# Patient Record
Sex: FEMALE | Race: OTHER | HISPANIC OR LATINO | URBAN - METROPOLITAN AREA
[De-identification: names, ages, dates, MRNs, and addresses within clinical notes are randomized per-mention and may not be internally consistent; named-entity substitution may affect disease eponyms.]

---

## 2018-12-07 ENCOUNTER — TRANSCRIBE ORDERS (OUTPATIENT)
Dept: ADMINISTRATIVE | Facility: HOSPITAL | Age: 38
End: 2018-12-07

## 2018-12-07 DIAGNOSIS — R22.42 MASS OF LOWER LEG, LEFT: ICD-10-CM

## 2018-12-07 DIAGNOSIS — R63.8 EFFECTS OF THIRST: Primary | ICD-10-CM

## 2018-12-12 ENCOUNTER — HOSPITAL ENCOUNTER (OUTPATIENT)
Dept: ULTRASOUND IMAGING | Facility: HOSPITAL | Age: 38
Discharge: HOME | End: 2018-12-12
Attending: FAMILY MEDICINE
Payer: COMMERCIAL

## 2018-12-12 DIAGNOSIS — R22.42 MASS OF LOWER LEG, LEFT: ICD-10-CM

## 2018-12-12 DIAGNOSIS — M79.652 LEFT THIGH PAIN: ICD-10-CM

## 2018-12-12 PROCEDURE — 76882 US LMTD JT/FCL EVL NVASC XTR: CPT

## 2023-12-20 ENCOUNTER — OFFICE VISIT (OUTPATIENT)
Dept: OBGYN CLINIC | Facility: CLINIC | Age: 43
End: 2023-12-20
Payer: COMMERCIAL

## 2023-12-20 VITALS
BODY MASS INDEX: 31.07 KG/M2 | HEIGHT: 64 IN | SYSTOLIC BLOOD PRESSURE: 114 MMHG | WEIGHT: 182 LBS | DIASTOLIC BLOOD PRESSURE: 70 MMHG

## 2023-12-20 DIAGNOSIS — Z11.3 ROUTINE SCREENING FOR STI (SEXUALLY TRANSMITTED INFECTION): ICD-10-CM

## 2023-12-20 DIAGNOSIS — Z01.419 ENCOUNTER FOR ANNUAL ROUTINE GYNECOLOGICAL EXAMINATION: Primary | ICD-10-CM

## 2023-12-20 DIAGNOSIS — R10.2 PELVIC PRESSURE IN FEMALE: ICD-10-CM

## 2023-12-20 DIAGNOSIS — Z12.31 ENCOUNTER FOR SCREENING MAMMOGRAM FOR MALIGNANT NEOPLASM OF BREAST: ICD-10-CM

## 2023-12-20 DIAGNOSIS — N84.1 ENDOCERVICAL POLYP: ICD-10-CM

## 2023-12-20 DIAGNOSIS — Z12.4 SCREENING FOR CERVICAL CANCER: ICD-10-CM

## 2023-12-20 PROCEDURE — 57500 BIOPSY OF CERVIX: CPT | Performed by: STUDENT IN AN ORGANIZED HEALTH CARE EDUCATION/TRAINING PROGRAM

## 2023-12-20 PROCEDURE — 99213 OFFICE O/P EST LOW 20 MIN: CPT | Performed by: STUDENT IN AN ORGANIZED HEALTH CARE EDUCATION/TRAINING PROGRAM

## 2023-12-20 PROCEDURE — 99386 PREV VISIT NEW AGE 40-64: CPT | Performed by: STUDENT IN AN ORGANIZED HEALTH CARE EDUCATION/TRAINING PROGRAM

## 2023-12-20 NOTE — ASSESSMENT & PLAN NOTE
- Endocervical polyp noted on exam. Likely source of patient-reported vaginal spotting. Polyp removed using ring forceps. Will send to pathology for evaluation.

## 2023-12-20 NOTE — PROGRESS NOTES
[facility] - [address], [address], [place] [address]    ASSESSMENT/PLAN: Donya Coon is a 43 y.o.  who presents for annual gynecologic exam. She is a new patient.    Encounter for routine gynecologic examination  - Routine well woman exam completed today.  - Cervical Cancer Screening: Current ASCCP Guidelines reviewed. Last Pap: 2022. History of abnormal: Remote history, returned to normal. No history of diagnostic excisional procedure. Per patient request, a pap was collected today. We reviewed that if normal, ASCCP guidelines allow for spacing of pap testing up to every 5 years.   - STI screening offered including HIV testing: GC/CT and blood work ordered  - Contraceptive counseling discussed.  Current contraception: vasectomy.   - Breast Cancer Screening: Last Mammogram 2022. Repeat ordered.   - The following were reviewed in today's visit: breast self exam, mammography screening ordered, exercise, and healthy diet    I explained to Donya that due to our extra time and review of a separate problem at her Wellness visit today, her insurance would be billed for a GYN Wellness visit and a Problem visit.  She may be responsible for a copay for this visit do to the problem component.  She understands and is fine with this.  She appreciated the extra time spent today.    Additional problems addressed during this visit:  1. Encounter for annual routine gynecological examination    2. Encounter for screening mammogram for malignant neoplasm of breast  -     Mammo screening bilateral w 3d & cad; Future    3. Pelvic pressure in female  Assessment & Plan:  - Given recent onset with associated gas, suspect GI etiology. However, will obtain pelvic ultrasound for evaluation of adnexal structures. Urinalysis and urine culture collected and sent from office today, given patient concern for possible UTI. Urine dipstick in office was unremarkable. Will await results and treat if  positive.     Orders:  -     Urinalysis with microscopic  -     Urine culture  -     US pelvis complete w transvaginal; Future; Expected date: 2023    4. Screening for cervical cancer  -     Thinprep Tis Pap, HPV mRNA E6/E7 Rfx HPV 16,18/45, Chlamydia/N.gonorrhoeae    5. Routine screening for STI (sexually transmitted infection)  -     Hepatitis C Ab W/Refl To HCV RNA, Qn, PCR; Future  -     HIV 1/2 AG/AB W REFLEX [facility] only; Future  -     RPR (MONITOR) W/REFL TITER (REFL); Future  -     Hepatitis C Ab W/Refl To HCV RNA, Qn, PCR  -     HIV 1/2 AG/AB W REFLEX [facility] only  -     RPR (MONITOR) W/REFL TITER (REFL)    6. Endocervical polyp  Assessment & Plan:  - Endocervical polyp noted on exam. Likely source of patient-reported vaginal spotting. Polyp removed using ring forceps. Will send to pathology for evaluation.     Orders:  -     Tissue Pathology        CC:  Annual Gynecologic Examination - New patient    HPI: Donya Coon is a 43 y.o.  who presents for annual gynecologic examination. Today, patient reports recent onset of left-sided pelvic pain and pressure. She is concerned for possible ovarian cyst and/or urinary tract infection. She also reports occasional mid-cycle vaginal spotting.     ROS: Negative except as noted in HPI    Patient's last menstrual period was 2023.  Menstrual History  Period Cycle (Days): 28  Period Duration (Days): 3-5  Period Pattern: Regular  Menstrual Flow: Moderate, Heavy  Dysmenorrhea: None    She  reports being sexually active and has had partner(s) who are male. She reports using the following method of birth control/protection: Male Sterilization.  Sexual History  Sexual Assault: No  Sexually Transmitted Infection History: None    The following portions of the patient's history were reviewed and updated as appropriate:   History reviewed. No pertinent past medical history.  Past Surgical History:   Procedure Laterality Date    SINUS SURGERY  "      Family History   Problem Relation Age of Onset    Stroke Mother     Colon cancer Maternal Grandfather     Breast cancer Paternal Grandmother     Ovarian cancer Neg Hx     Uterine cancer Neg Hx      Social History     Socioeconomic History    Marital status: /Civil Union     Spouse name: None    Number of children: None    Years of education: None    Highest education level: None   Occupational History    None   Tobacco Use    Smoking status: Never     Passive exposure: Never    Smokeless tobacco: Never   Vaping Use    Vaping status: Never Used   Substance and Sexual Activity    Alcohol use: Not Currently    Drug use: Never    Sexual activity: Yes     Partners: Male     Birth control/protection: Male Sterilization   Other Topics Concern    None   Social History Narrative    None     Social Determinants of Health     Financial Resource Strain: Not on file   Food Insecurity: Not on file   Transportation Needs: Not on file   Physical Activity: Not on file   Stress: Not on file   Social Connections: Not on file   Intimate Partner Violence: Not on file   Housing Stability: Not on file     No outpatient medications have been marked as taking for the 12/20/23 encounter (Office Visit) with George Martinez MD.     Allergies   Allergen Reactions    Penicillins Hives and Rash           Objective:  /70 (BP Location: Left arm, Patient Position: Sitting, Cuff Size: Large)   Ht 5' 4\" (1.626 m)   Wt 82.6 kg (182 lb)   LMP 11/28/2023   BMI 31.24 kg/m²        Chaperone present? Yes: Tae Mariee MA.    General Appearance: alert and oriented, in no acute distress.   Neck/Thyroid: No thyromegaly, no thyroid nodules.  Respiratory: Unlabored breathing.  Cardiovascular: Regular rate, no peripheral edema.  Abdomen: Soft, non-tender, non-distended, no masses, no rebound or guarding.  Breast Exam: No dimpling, nipple retraction or discharge. No lumps or masses. No axillary or supraclavicular nodes.   Pelvic:       " External genitalia: Normal appearance, no abnormal pigmentation, no lesions or masses. Normal Bartholin's and [place]'s.      Urinary system: Urethral meatus normal, bladder non-tender.      Vaginal: normal mucosa without prolapse or lesions. Normal-appearing physiologic discharge.      Cervix: Approximately 2-3 mm endocervical polyp noted. Polyp grasped with ring forceps and removed with gentle traction. Cervix well-epithelialized, no gross lesions or masses. No cervical motion tenderness.      Adnexa: No adnexal masses or tenderness noted.      Uterus: Normal-sized, regular contour, midline, mobile, no uterine tenderness.  Extremities: Normal range of motion. Warm, well-perfused, non-tender.   Skin: normal, no rash or abnormalities  Neurologic: alert, oriented x3  Psychiatric: Appropriate affect, mood stable, cooperative with exam.        George Martinez MD  12/20/2023 4:53 PM

## 2023-12-20 NOTE — ASSESSMENT & PLAN NOTE
- Given recent onset with associated gas, suspect GI etiology. However, will obtain pelvic ultrasound for evaluation of adnexal structures. Urinalysis and urine culture collected and sent from office today, given patient concern for possible UTI. Urine dipstick in office was unremarkable. Will await results and treat if positive.

## 2023-12-21 LAB
APPEARANCE UR: CLEAR
BACTERIA UR QL AUTO: NORMAL /HPF
BILIRUB UR QL STRIP: NEGATIVE
COLOR UR: YELLOW
GLUCOSE UR QL STRIP: NEGATIVE
HGB UR QL STRIP: NEGATIVE
HYALINE CASTS #/AREA URNS LPF: NORMAL /LPF
KETONES UR QL STRIP: NEGATIVE
LEUKOCYTE ESTERASE UR QL STRIP: NEGATIVE
NITRITE UR QL STRIP: NEGATIVE
PH UR STRIP: 6 [PH] (ref 5–8)
PROT UR QL STRIP: NEGATIVE
RBC #/AREA URNS HPF: NORMAL /HPF
SP GR UR STRIP: 1.01 (ref 1–1.03)
SQUAMOUS #/AREA URNS HPF: NORMAL /HPF
WBC #/AREA URNS HPF: NORMAL /HPF

## 2023-12-22 LAB
C TRACH RRNA SPEC QL NAA+PROBE: NOT DETECTED
CLINICAL INFO: NORMAL
N GONORRHOEA RRNA SPEC QL NAA+PROBE: NOT DETECTED
PATH REPORT.FINAL DX SPEC: NORMAL
PATHOLOGIST NAME: NORMAL
SPECIMEN SOURCE: NORMAL

## 2023-12-26 NOTE — RESULT ENCOUNTER NOTE
Left voicemail for patient informing her of normal urine results and benign endometrial polyp. Reminded her of upcoming pelvic US scheduled for 1/16, which is important due to possibility that there is residual endometrial polyp which may require removal. Instructed patient to call the office if she has any questions.     George Martinez MD  12/26/2023 12:46 PM

## 2023-12-28 LAB
C TRACH RRNA SPEC QL NAA+PROBE: NOT DETECTED
CLINICAL INFO: NORMAL
CYTO CVX: NORMAL
DATE PREVIOUS BX: NORMAL
HPV E6+E7 MRNA CVX QL NAA+PROBE: NOT DETECTED
LMP START DATE: NORMAL
N GONORRHOEA RRNA SPEC QL NAA+PROBE: NOT DETECTED
SL AMB PREV. PAP:: NORMAL
SPECIMEN SOURCE CVX/VAG CYTO: NORMAL

## 2024-01-15 ENCOUNTER — HOSPITAL ENCOUNTER (OUTPATIENT)
Dept: ULTRASOUND IMAGING | Facility: HOSPITAL | Age: 44
Discharge: HOME | End: 2024-01-15
Attending: STUDENT IN AN ORGANIZED HEALTH CARE EDUCATION/TRAINING PROGRAM
Payer: COMMERCIAL

## 2024-01-15 DIAGNOSIS — R10.2 PELVIC PRESSURE IN FEMALE: ICD-10-CM

## 2024-01-15 PROCEDURE — 76830 TRANSVAGINAL US NON-OB: CPT

## 2024-01-15 PROCEDURE — 76856 US EXAM PELVIC COMPLETE: CPT

## 2024-01-16 ENCOUNTER — HOSPITAL ENCOUNTER (OUTPATIENT)
Dept: CT IMAGING | Facility: HOSPITAL | Age: 44
Discharge: HOME | End: 2024-01-16
Attending: FAMILY MEDICINE
Payer: COMMERCIAL

## 2024-01-16 DIAGNOSIS — Z13.6 ENCOUNTER FOR SCREENING FOR CARDIOVASCULAR DISORDERS: ICD-10-CM

## 2024-01-16 PROCEDURE — 75571 CT HRT W/O DYE W/CA TEST: CPT

## 2024-01-16 PROCEDURE — G1004 CDSM NDSC: HCPCS

## 2024-01-23 ENCOUNTER — TELEPHONE (OUTPATIENT)
Dept: OBGYN CLINIC | Facility: CLINIC | Age: 44
End: 2024-01-23

## 2024-01-23 NOTE — TELEPHONE ENCOUNTER
Called patient to discuss. No answer. I left voicemail informing patient that I sent her a detailed message regarding her result via [employer] earlier today. Encouraged patient to call if she would like to discuss further. See result message attached to ultrasound report.     George Martinez MD  1/23/2024 4:58 PM

## 2024-01-23 NOTE — TELEPHONE ENCOUNTER
Pilar l/m she would like her pelvic u/s results and recommendations. Return call, l/m on pilar's v/m  U/s resulted on Sunday. Dr. Martinez covered hospital yesterday- Seeing patients today. Will forward to DR Martinez and call her back with result and recommendation most likely on Wednesday. Dr. Martinez, please review and advise

## 2024-06-08 LAB
HCV AB SERPL QL IA: NORMAL
HIV 1+2 AB+HIV1 P24 AG SERPL QL IA: NORMAL
RPR SER QL: NORMAL

## 2024-10-01 ENCOUNTER — TELEPHONE (OUTPATIENT)
Age: 44
End: 2024-10-01

## 2024-10-01 NOTE — TELEPHONE ENCOUNTER
Patient called and will have Mammo done at [facility]. Patient would like a hard copy of script mailed to her.

## 2024-12-30 ENCOUNTER — ANNUAL EXAM (OUTPATIENT)
Dept: OBGYN CLINIC | Facility: CLINIC | Age: 44
End: 2024-12-30
Payer: COMMERCIAL

## 2024-12-30 VITALS
BODY MASS INDEX: 31.92 KG/M2 | WEIGHT: 191.6 LBS | HEIGHT: 65 IN | SYSTOLIC BLOOD PRESSURE: 126 MMHG | DIASTOLIC BLOOD PRESSURE: 76 MMHG

## 2024-12-30 DIAGNOSIS — N92.0 MENORRHAGIA WITH REGULAR CYCLE: ICD-10-CM

## 2024-12-30 DIAGNOSIS — Z01.419 ENCOUNTER FOR ANNUAL ROUTINE GYNECOLOGICAL EXAMINATION: Primary | ICD-10-CM

## 2024-12-30 DIAGNOSIS — Z80.3 FAMILY HISTORY OF BREAST CANCER IN FEMALE: ICD-10-CM

## 2024-12-30 DIAGNOSIS — Z12.4 SCREENING FOR CERVICAL CANCER: ICD-10-CM

## 2024-12-30 DIAGNOSIS — Z12.31 BREAST CANCER SCREENING BY MAMMOGRAM: ICD-10-CM

## 2024-12-30 PROCEDURE — 99396 PREV VISIT EST AGE 40-64: CPT | Performed by: STUDENT IN AN ORGANIZED HEALTH CARE EDUCATION/TRAINING PROGRAM

## 2024-12-30 RX ORDER — ROSUVASTATIN CALCIUM 5 MG/1
1 TABLET, COATED ORAL DAILY
COMMUNITY
Start: 2024-12-02

## 2024-12-30 RX ORDER — NORGESTIMATE AND ETHINYL ESTRADIOL 7DAYSX3 LO
1 KIT ORAL DAILY
Qty: 84 TABLET | Refills: 1 | Status: SHIPPED | OUTPATIENT
Start: 2024-12-30

## 2024-12-30 NOTE — PROGRESS NOTES
Name: Donya Coon      : 1980      MRN: [id number]  Encounter Provider: George Martinez MD  Encounter Date: 2024   Encounter department: [facility] [place]  :  Assessment & Plan  Encounter for annual routine gynecological examination  - Routine well woman gynecologic completed today.  - Cervical Cancer Screening: Current ASCCP Guidelines reviewed. Last Pap: 2023. History of abnormal: Remote history. A pap was collected today, per patient request.   - STI screening offered including HIV testing: GC/CT and blood work ordered  - Contraceptive counseling discussed. Current contraception: partner had vasectomy  - Breast Cancer Screening: Last Mammogram 2022. Repeat is schedueld.   - Colorectal cancer screening was not ordered, as she is scheduled for colonoscopy next week.   - The following were reviewed in today's visit: breast self exam, mammography screening ordered, exercise, and healthy diet       Menorrhagia with regular cycle  - Suspect secondary to adenomyosis based on findings of pelvic ultrasound last year. Discussed management options including OCP, LNG-IUD, DMPA, or hysterectomy. She desires trial of OCP. No contraindication. Rx provided. Return to office for pill check in 3-4 months.   Orders:  •  Norgestimate-Eth Estradiol (Ortho Tri-Cyclen Lo) 0.18/0.215/0.25 MG-25 MCG TABS; Take 1 tablet by mouth in the morning    Family history of breast cancer in female    Orders:  •  Mammo screening bilateral w 3d and cad; Future    Breast cancer screening by mammogram    Orders:  •  Mammo screening bilateral w 3d and cad; Future    Screening for cervical cancer    Orders:  •  Thinprep Tis Pap, HPV mRNA E6/E7 Rfx HPV 16,18/45, Chlamydia/N.gonorrhoeae        History of Present Illness   HPI  Donya Coon is a 44 y.o. female who presents for routine gynecologic preventative care visit. Today, she reports heavy menses and desires to discuss management options.   History  "obtained from: patient    Period Cycle (Days): 28  Period Duration (Days): 4-5  Period Pattern: Regular  Menstrual Flow: Heavy  Menstrual Control: Maxi pad      Review of Systems   All other systems reviewed and are negative.  Medical History Reviewed by provider this encounter:     .     Objective   /76 (BP Location: Left arm, Patient Position: Sitting, Cuff Size: Large)   Ht 5' 4.75\" (1.645 m)   Wt 86.9 kg (191 lb 9.6 oz)   LMP 12/05/2024 (Exact Date)   BMI 32.13 kg/m²      Physical Exam  Vitals reviewed. Exam conducted with a chaperone present (Terra Campos MA).   Constitutional:       General: She is not in acute distress.     Appearance: Normal appearance. She is well-developed.   HENT:      Head: Normocephalic.   Eyes:      Conjunctiva/sclera: Conjunctivae normal.   Cardiovascular:      Rate and Rhythm: Normal rate.   Pulmonary:      Effort: Pulmonary effort is normal. No respiratory distress.   Chest:      Chest wall: No mass or deformity.   Breasts:     Right: Normal. No mass, nipple discharge, skin change or tenderness.      Left: Normal. No mass, nipple discharge, skin change or tenderness.   Abdominal:      General: Abdomen is flat.      Palpations: Abdomen is soft.      Tenderness: There is no abdominal tenderness.   Genitourinary:     General: Normal vulva.      Exam position: Lithotomy position.      Labia:         Right: No tenderness, lesion or injury.         Left: No tenderness, lesion or injury.       Urethra: No urethral pain, urethral swelling or urethral lesion.      Vagina: Normal. No vaginal discharge, bleeding, lesions or prolapsed vaginal walls.      Cervix: Normal. No cervical motion tenderness, discharge, friability, lesion, erythema or cervical bleeding.      Uterus: Normal. Not deviated and not tender.       Adnexa: Right adnexa normal and left adnexa normal.        Right: No mass, tenderness or fullness.          Left: No mass, tenderness or fullness.     Musculoskeletal:    "      General: No swelling.   Lymphadenopathy:      Upper Body:      Right upper body: No supraclavicular, axillary or pectoral adenopathy.      Left upper body: No supraclavicular, axillary or pectoral adenopathy.   Skin:     General: Skin is warm and dry.   Neurological:      Mental Status: She is alert.   Psychiatric:         Mood and Affect: Mood normal.         Behavior: Behavior normal.         Thought Content: Thought content normal.         Judgment: Judgment normal.

## 2025-01-02 ENCOUNTER — RESULTS FOLLOW-UP (OUTPATIENT)
Dept: LABOR AND DELIVERY | Facility: HOSPITAL | Age: 45
End: 2025-01-02

## 2025-01-02 LAB
C TRACH RRNA SPEC QL NAA+PROBE: NOT DETECTED
CLINICAL INFO: NORMAL
CYTO CVX: NORMAL
CYTOLOGY CMNT CVX/VAG CYTO-IMP: NORMAL
DATE PREVIOUS BX: NORMAL
HPV E6+E7 MRNA CVX QL NAA+PROBE: NOT DETECTED
LMP START DATE: NORMAL
N GONORRHOEA RRNA SPEC QL NAA+PROBE: NOT DETECTED
SL AMB PREV. PAP:: NORMAL
SPECIMEN SOURCE CVX/VAG CYTO: NORMAL

## 2025-01-07 ENCOUNTER — HOSPITAL ENCOUNTER (OUTPATIENT)
Dept: MAMMOGRAPHY | Facility: IMAGING CENTER | Age: 45
Discharge: HOME | End: 2025-01-07
Payer: COMMERCIAL

## 2025-01-07 VITALS — WEIGHT: 189 LBS | HEIGHT: 65 IN | BODY MASS INDEX: 31.49 KG/M2

## 2025-01-07 DIAGNOSIS — Z80.3 FAMILY HISTORY OF BREAST CANCER IN FEMALE: ICD-10-CM

## 2025-01-07 DIAGNOSIS — Z12.31 BREAST CANCER SCREENING BY MAMMOGRAM: ICD-10-CM

## 2025-01-07 PROCEDURE — 77063 BREAST TOMOSYNTHESIS BI: CPT

## 2025-01-07 PROCEDURE — 77067 SCR MAMMO BI INCL CAD: CPT

## 2025-04-23 ENCOUNTER — OFFICE VISIT (OUTPATIENT)
Dept: URGENT CARE | Facility: CLINIC | Age: 45
End: 2025-04-23
Payer: COMMERCIAL

## 2025-04-23 ENCOUNTER — APPOINTMENT (OUTPATIENT)
Dept: RADIOLOGY | Facility: CLINIC | Age: 45
End: 2025-04-23
Attending: FAMILY MEDICINE
Payer: COMMERCIAL

## 2025-04-23 VITALS
DIASTOLIC BLOOD PRESSURE: 78 MMHG | TEMPERATURE: 97.9 F | OXYGEN SATURATION: 98 % | RESPIRATION RATE: 18 BRPM | HEART RATE: 72 BPM | SYSTOLIC BLOOD PRESSURE: 126 MMHG

## 2025-04-23 DIAGNOSIS — S99.922A INJURY OF LEFT FOOT, INITIAL ENCOUNTER: ICD-10-CM

## 2025-04-23 DIAGNOSIS — S92.512A CLOSED DISPLACED FRACTURE OF PROXIMAL PHALANX OF LESSER TOE OF LEFT FOOT, INITIAL ENCOUNTER: Primary | ICD-10-CM

## 2025-04-23 PROCEDURE — 73630 X-RAY EXAM OF FOOT: CPT

## 2025-04-23 PROCEDURE — G0382 LEV 3 HOSP TYPE B ED VISIT: HCPCS | Performed by: FAMILY MEDICINE

## 2025-04-23 NOTE — PROGRESS NOTES
[facility]        NAME: Donya Coon is a 44 y.o. female  : 1980    MRN: [id number]  DATE: 2025  TIME: 6:22 PM    Assessment and Plan   Closed displaced fracture of proximal phalanx of lesser toe of left foot, initial encounter [S92.512A]  1. Closed displaced fracture of proximal phalanx of lesser toe of left foot, initial encounter  Ambulatory referral to Orthopedic Surgery      2. Injury of left foot, initial encounter  XR foot 3+ vw left            Patient Instructions   Postop walking shoe as instructed.  Recommend ice to the affected area for pain and swelling.  May use ibuprofen as needed for pain relief.  Follow-up with orthopedics.    Follow up with PCP in 3-5 days.  Proceed to  ER if symptoms worsen.    If tests have been performed at [facility], our office will contact you with results if changes need to be made to the care plan discussed with you at the visit.  [facility]    Chief Complaint     Chief Complaint   Patient presents with    Foot Pain     Patient states she hit her left pinky toe on the side of her wall tonight, she is concerned she may have sprained her toe or foot.          History of Present Illness       44-year-old female presenting with left toe pain.  She reports stubbing her little toe on the corner of the wall as she was running around the corner in her house.  She is now experiencing pain and swelling of her little toe and does note some deformity.        Review of Systems   Review of Systems   Constitutional: Negative.    HENT: Negative.     Eyes: Negative.    Respiratory: Negative.     Cardiovascular: Negative.    Gastrointestinal: Negative.    Genitourinary: Negative.    Musculoskeletal:  Positive for arthralgias, joint swelling and myalgias.   Skin: Negative.    Allergic/Immunologic: Negative.    Neurological: Negative.    Hematological: Negative.    Psychiatric/Behavioral: Negative.           Current  Medications       Current Outpatient Medications:     Biotin 5 MG TBDP, Take by mouth, Disp: , Rfl:     Emollient (COLLAGEN EX), Apply topically, Disp: , Rfl:     Norgestimate-Eth Estradiol (Ortho Tri-Cyclen Lo) 0.18/0.215/0.25 MG-25 MCG TABS, Take 1 tablet by mouth in the morning, Disp: 84 tablet, Rfl: 1    rosuvastatin (CRESTOR) 5 mg tablet, Take 1 tablet by mouth in the morning, Disp: , Rfl:     Current Allergies     Allergies as of 04/23/2025 - Reviewed 04/23/2025   Allergen Reaction Noted    Penicillins Hives and Rash 09/30/2015            The following portions of the patient's history were reviewed and updated as appropriate: allergies, current medications, past family history, past medical history, past social history, past surgical history and problem list.     History reviewed. No pertinent past medical history.    Past Surgical History:   Procedure Laterality Date    BREAST CYST EXCISION Right     Unknown year-benign    SINUS SURGERY         Family History   Problem Relation Age of Onset    Stroke Mother     No Known Problems Father     No Known Problems Sister     No Known Problems Daughter     No Known Problems Daughter     No Known Problems Maternal Grandmother     Colon cancer Maternal Grandfather         Unknown age    Prostate cancer Maternal Grandfather         80's    Lung cancer Maternal Grandfather         80's    Breast cancer Paternal Grandmother         40's    No Known Problems Paternal Grandfather     No Known Problems Paternal Aunt     Ovarian cancer Neg Hx     Uterine cancer Neg Hx          Medications have been verified.        Objective   /78   Pulse 72   Temp 97.9 °F (36.6 °C)   Resp 18   SpO2 98%   No LMP recorded.       Physical Exam     Physical Exam  Vitals and nursing note reviewed.   Constitutional:       Appearance: She is well-developed.   HENT:      Head: Normocephalic.      Nose: Nose normal.   Eyes:      Pupils: Pupils are equal, round, and reactive to light.    Cardiovascular:      Rate and Rhythm: Normal rate and regular rhythm.   Pulmonary:      Effort: Pulmonary effort is normal.   Abdominal:      General: Abdomen is flat.   Musculoskeletal:         General: Swelling, tenderness and signs of injury present. Normal range of motion.      Cervical back: Normal range of motion.   Skin:     General: Skin is warm and dry.   Neurological:      Mental Status: She is alert and oriented to person, place, and time.

## 2025-04-26 ENCOUNTER — TELEPHONE (OUTPATIENT)
Dept: OBGYN CLINIC | Facility: HOSPITAL | Age: 45
End: 2025-04-26

## 2025-04-26 NOTE — TELEPHONE ENCOUNTER
Caller: Patient    Doctor: N/A    Reason for call: Patient has broken toe - adv would need to CB Monday to schedule with Podiatry and gave phone #    Call back#: [phone]

## 2025-08-14 ENCOUNTER — NURSE TRIAGE (OUTPATIENT)
Age: 45
End: 2025-08-14